# Patient Record
Sex: MALE | NOT HISPANIC OR LATINO
[De-identification: names, ages, dates, MRNs, and addresses within clinical notes are randomized per-mention and may not be internally consistent; named-entity substitution may affect disease eponyms.]

---

## 2017-06-29 ENCOUNTER — APPOINTMENT (OUTPATIENT)
Dept: HUMAN REPRODUCTION | Facility: CLINIC | Age: 42
End: 2017-06-29

## 2022-10-26 ENCOUNTER — EMERGENCY (EMERGENCY)
Facility: HOSPITAL | Age: 47
LOS: 1 days | Discharge: HOME | End: 2022-10-26
Attending: EMERGENCY MEDICINE
Payer: COMMERCIAL

## 2022-10-26 VITALS
HEART RATE: 93 BPM | RESPIRATION RATE: 18 BRPM | SYSTOLIC BLOOD PRESSURE: 134 MMHG | DIASTOLIC BLOOD PRESSURE: 77 MMHG | TEMPERATURE: 99 F | OXYGEN SATURATION: 98 %

## 2022-10-26 VITALS
OXYGEN SATURATION: 100 % | WEIGHT: 169.98 LBS | TEMPERATURE: 99 F | HEART RATE: 92 BPM | DIASTOLIC BLOOD PRESSURE: 80 MMHG | SYSTOLIC BLOOD PRESSURE: 132 MMHG | RESPIRATION RATE: 16 BRPM | HEIGHT: 72 IN

## 2022-10-26 PROCEDURE — 96372 THER/PROPH/DIAG INJ SC/IM: CPT

## 2022-10-26 PROCEDURE — 23650 CLTX SHO DSLC W/MNPJ WO ANES: CPT | Mod: LT

## 2022-10-26 PROCEDURE — 23650 CLTX SHO DSLC W/MNPJ WO ANES: CPT | Mod: 54

## 2022-10-26 PROCEDURE — 73060 X-RAY EXAM OF HUMERUS: CPT

## 2022-10-26 PROCEDURE — 73030 X-RAY EXAM OF SHOULDER: CPT

## 2022-10-26 PROCEDURE — 99284 EMERGENCY DEPT VISIT MOD MDM: CPT | Mod: 25

## 2022-10-26 PROCEDURE — 73030 X-RAY EXAM OF SHOULDER: CPT | Mod: 26,LT

## 2022-10-26 PROCEDURE — 73020 X-RAY EXAM OF SHOULDER: CPT

## 2022-10-26 PROCEDURE — 73060 X-RAY EXAM OF HUMERUS: CPT | Mod: 26,LT

## 2022-10-26 PROCEDURE — 99284 EMERGENCY DEPT VISIT MOD MDM: CPT | Mod: 57

## 2022-10-26 RX ORDER — LIDOCAINE HCL 20 MG/ML
10 VIAL (ML) INJECTION ONCE
Refills: 0 | Status: COMPLETED | OUTPATIENT
Start: 2022-10-26 | End: 2022-10-26

## 2022-10-26 RX ORDER — DIAZEPAM 5 MG
5 TABLET ORAL ONCE
Refills: 0 | Status: DISCONTINUED | OUTPATIENT
Start: 2022-10-26 | End: 2022-10-26

## 2022-10-26 RX ORDER — KETOROLAC TROMETHAMINE 30 MG/ML
30 SYRINGE (ML) INJECTION ONCE
Refills: 0 | Status: DISCONTINUED | OUTPATIENT
Start: 2022-10-26 | End: 2022-10-26

## 2022-10-26 RX ADMIN — Medication 10 MILLILITER(S): at 20:25

## 2022-10-26 RX ADMIN — Medication 5 MILLIGRAM(S): at 19:32

## 2022-10-26 RX ADMIN — Medication 30 MILLIGRAM(S): at 19:32

## 2022-10-26 NOTE — ED PROVIDER NOTE - ATTENDING CONTRIBUTION TO CARE
Gage Garcia MD:   I personally saw the patient and performed a substantive portion of the visit including all aspects of the medical decision making.    MDM: 47-year-old male who is right-hand dominant who was brought in by EMS for left shoulder pain with concern for dislocation.  Patient was at [employer] and performing clean and jerk, acute onset of pain in that position.  Patient reports that the bar hit the top of his head and caused an abrasion, but no significant head injury, LOC, nausea, vomiting, headache, numbness, weakness.  On examination patient holds his left shoulder in a hyperabducted position.  There is fullness in the axilla.  There is no open skin wound.  There is normal neurovascular examination in the affected extremity.  Concern for shoulder dislocation, specifically inferior dislocation given his presenting position.  Will give pain control and obtain stat x-ray and reduce. Gage Garcia MD:   I personally saw the patient and performed a substantive portion of the visit including all aspects of the medical decision making.    MDM: 47-year-old male who is right-hand dominant who was brought in by EMS for left shoulder pain with concern for dislocation.  Patient was at [employer] and performing clean and jerk, acute onset of pain in that position.  Patient reports that the bar hit the top of his head and caused an abrasion, but no significant head injury, LOC, nausea, vomiting, headache, numbness, weakness.  On examination patient holds his left shoulder in a hyperabducted position.  There is fullness in the axilla.  There is no open skin wound.  There is normal neurovascular examination in the affected extremity.  Concern for shoulder dislocation, specifically inferior dislocation given his presenting position.  Will give pain control and obtain stat x-ray and reduce.    Reduction performed after analgesia and anxiolysis, no sedation required. Pre and post reduction NVI. Post reduction films w/ normal GHJ alignment. Patient reassessed and reports improved symptoms. Repeat MSK examination shows normal ROM, soft compartments, and NVI. Ambulatory in the ED with no antalgic gait and no assistance. F/u w/ Sports/Ortho in 1 week, remain in sling with only gentle ROM.

## 2022-10-26 NOTE — ED PROVIDER NOTE - OBJECTIVE STATEMENT
47-year-old male with no significant past medical history presenting with left shoulder pain with probable dislocation sustained during a workout.  Patient was [id number] pound weight when he felt a sudden pain in his left shoulder, and found that he could not bring it down from a overhead flexed position.  Patient states it hurts with any movement in any other position, is unable to bring it back down, has not taken anything for his pain, denies any numbness, tingling, paresthesias.  No comfortable position and neck to feel better.  Describes it as a constant dull achy pain in his left shoulder.

## 2022-10-26 NOTE — ED PROCEDURE NOTE - ATTENDING CONTRIBUTION TO CARE
I, EM Attending, Gage Garcia was present for and supervised the entirety of the procedure performed by the Resident Physician or SOPHIA.

## 2022-10-26 NOTE — ED ADULT NURSE NOTE - OBJECTIVE STATEMENT
46 yo male AAOX3 presents to ED via EMS for left shoulder pain. As per pt, was at crossfit performing weight move when he experienced sudden pain. Pain worsens with movement, relieved somewhat when left arm is positioned above head. +Sensation to touch b/l, deformity noted to left shoulder, no bruising/bleeding/swelling noted. Safety maintained, bed in low position and side rails up.

## 2022-10-26 NOTE — ED PROVIDER NOTE - PHYSICAL EXAMINATION
Physical Exam:   GEN:adult male in mod pain and distress, AAOx3  HENT: NCAT, MMM, no stridor  EYES: PERRLA, EOMI  RESP: CTAB, no respiratory distress  CV: normal rate and regular rhythm, S1 S2  ABD: soft and NTND  EXT: Left shoulder noted to be in a hyperflexed, hyperabducted position.  Humeral head can be palpated in the left axilla, unable to range shoulder due to pain and likely dislocation.  Good distal pulses appreciated, patient able to perform all hand gestures including opposition abduction flexion extension with no deficits.  Deep sulcus appreciated in the left deltoid, with no numbness.   warmth or erythema in the area appreciated.  SKIN: No skin breaks, skin color normal for race  NEURO: CN grossly intact, No focal motor or sensory deficits.

## 2022-10-26 NOTE — ED PROVIDER NOTE - PATIENT PORTAL LINK FT
You can access the FollowMyHealth Patient Portal offered by [facility] by registering at the following website: [email or web address]. By joining [facility]’s FollowMyHealth portal, you will also be able to view your health information using other applications (apps) compatible with our system.

## 2022-10-26 NOTE — ED PROVIDER NOTE - CLINICAL SUMMARY MEDICAL DECISION MAKING FREE TEXT BOX
Patient with likely dislocation, was confirmed on x-ray, appears to be anteromedial in nature, patient was provided with Valium as well as Toradol for pain, then received an intra-articular injection into the left shoulder with 1% lidocaine.  Both noted in procedure notes separately.  Patient was reduced with a modified spasm method of hypersupination with flex shoulder and traction, shoulder easily reduced, postreduction x-ray confirmed proper location, patient was placed in a shoulder immobilizer, and was advised to follow-up with his doctor within the next week and to take out arm 1-2 times a day for COVID exercises, demonstrated Codman exercises the patient, patient expressed understanding of instructions and was discharged stable condition.  Provided with prescription for ibuprofen.

## 2022-10-26 NOTE — ED PROVIDER NOTE - NSFOLLOWUPINSTRUCTIONS_ED_ALL_ED_FT
Dislocation    A dislocation is a displacement of the bones that form a joint. This can result from trauma or due to a previous weakening of that joint. Symptoms include pain, swelling, and deformity at the site. Your health care provider has performed maneuvers to place the bones back in place. If a splint or brace was applied, make sure to wear it until you see an orthopedist as instructed.     SEEK IMMEDIATE MEDICAL CARE IF YOU HAVE ANY OF THE FOLLOWING SYMPTOMS: numbness, tingling, pain, weakness, or skin color/temperature change in any part of your body distal to the injury.     1) Please follow-up with your Primary Medical Doctor in 3-5 days. If you do not have a primary doctor, please call the Physician Referral Service. If you have trouble making an appointment inform the office that you were recently seen in the Emergency Department and it is an urgent matter. Bring a copy of your results with you to your appointment.  2) Return to the Emergency Department for persistent, worsening, or new symptoms, or if you experience fever, chills, chest pain, shortness of breath, dizziness, fainting, abdominal pain, nausea or vomiting, inability to eat or drink, difficulty with urination, numbness, weakness, or inability to walk safely.   3)

## 2022-10-26 NOTE — ED ADULT NURSE NOTE - CAS ELECT INFOMATION PROVIDED
follow up with PMD and ortho. Return for any worsening. Take Tylenol or Motrin for pain./DC instructions